# Patient Record
Sex: FEMALE | Race: WHITE | NOT HISPANIC OR LATINO | Employment: OTHER | URBAN - METROPOLITAN AREA
[De-identification: names, ages, dates, MRNs, and addresses within clinical notes are randomized per-mention and may not be internally consistent; named-entity substitution may affect disease eponyms.]

---

## 2023-01-16 PROBLEM — R05.9 COUGH: Status: ACTIVE | Noted: 2023-01-16

## 2023-01-16 PROBLEM — J06.9 ACUTE UPPER RESPIRATORY INFECTION: Status: ACTIVE | Noted: 2023-01-16

## 2023-01-16 PROBLEM — I10 ESSENTIAL HYPERTENSION, BENIGN: Status: ACTIVE | Noted: 2023-01-16

## 2023-01-16 PROBLEM — D50.9 IRON DEFICIENCY ANEMIA, UNSPECIFIED: Status: ACTIVE | Noted: 2023-01-16

## 2023-01-16 PROBLEM — K51.90 ULCERATIVE COLITIS (MULTI): Status: ACTIVE | Noted: 2023-01-16

## 2023-01-16 PROBLEM — E78.5 HYPERLIPIDEMIA: Status: ACTIVE | Noted: 2023-01-16

## 2023-01-16 PROBLEM — E11.65 DIABETES MELLITUS WITH HYPERGLYCEMIA (MULTI): Status: ACTIVE | Noted: 2023-01-16

## 2023-01-16 PROBLEM — D64.9 ANEMIA, UNSPECIFIED: Status: ACTIVE | Noted: 2023-01-16

## 2023-01-16 PROBLEM — R60.9 EDEMA: Status: ACTIVE | Noted: 2023-01-16

## 2023-01-16 PROBLEM — F41.8 SITUATIONAL ANXIETY: Status: ACTIVE | Noted: 2023-01-16

## 2023-01-16 PROBLEM — J20.9 ACUTE BRONCHITIS: Status: ACTIVE | Noted: 2023-01-16

## 2023-01-16 PROBLEM — B37.9 CANDIDIASIS: Status: ACTIVE | Noted: 2023-01-16

## 2023-01-16 RX ORDER — SIMVASTATIN 40 MG/1
40 TABLET, FILM COATED ORAL EVERY EVENING
COMMUNITY
End: 2023-09-05 | Stop reason: ALTCHOICE

## 2023-01-16 RX ORDER — BLOOD SUGAR DIAGNOSTIC
STRIP MISCELLANEOUS
COMMUNITY
End: 2023-01-17 | Stop reason: ENTERED-IN-ERROR

## 2023-01-16 RX ORDER — LOSARTAN POTASSIUM 50 MG/1
50 TABLET ORAL DAILY
COMMUNITY
End: 2023-08-01 | Stop reason: SDUPTHER

## 2023-01-16 RX ORDER — LANCETS
EACH MISCELLANEOUS
COMMUNITY

## 2023-01-16 RX ORDER — HYDROCHLOROTHIAZIDE 12.5 MG/1
12.5 CAPSULE ORAL DAILY PRN
COMMUNITY

## 2023-01-16 RX ORDER — INSULIN LISPRO 100 [IU]/ML
INJECTION, SOLUTION INTRAVENOUS; SUBCUTANEOUS
COMMUNITY

## 2023-01-16 RX ORDER — LANCETS
1 EACH MISCELLANEOUS 3 TIMES DAILY
COMMUNITY

## 2023-01-16 RX ORDER — MESALAMINE 800 MG/1
4800 TABLET, DELAYED RELEASE ORAL DAILY
COMMUNITY

## 2023-01-17 PROBLEM — Z79.4 TYPE 2 DIABETES MELLITUS WITH HYPERGLYCEMIA, WITH LONG-TERM CURRENT USE OF INSULIN (MULTI): Status: ACTIVE | Noted: 2023-01-16

## 2023-01-17 RX ORDER — PEN NEEDLE, DIABETIC 30 GX3/16"
NEEDLE, DISPOSABLE MISCELLANEOUS
COMMUNITY

## 2023-02-21 PROBLEM — E78.5 DYSLIPIDEMIA: Status: ACTIVE | Noted: 2023-02-21

## 2023-02-28 LAB
ALANINE AMINOTRANSFERASE (SGPT) (U/L) IN SER/PLAS: 15 U/L (ref 7–45)
ALBUMIN (G/DL) IN SER/PLAS: 3.9 G/DL (ref 3.4–5)
ALKALINE PHOSPHATASE (U/L) IN SER/PLAS: 78 U/L (ref 33–136)
ANION GAP IN SER/PLAS: 12 MMOL/L (ref 10–20)
ASPARTATE AMINOTRANSFERASE (SGOT) (U/L) IN SER/PLAS: 14 U/L (ref 9–39)
BILIRUBIN TOTAL (MG/DL) IN SER/PLAS: 0.5 MG/DL (ref 0–1.2)
CALCIUM (MG/DL) IN SER/PLAS: 9.7 MG/DL (ref 8.6–10.6)
CARBON DIOXIDE, TOTAL (MMOL/L) IN SER/PLAS: 32 MMOL/L (ref 21–32)
CHLORIDE (MMOL/L) IN SER/PLAS: 102 MMOL/L (ref 98–107)
CHOLESTEROL (MG/DL) IN SER/PLAS: 201 MG/DL (ref 0–199)
CHOLESTEROL IN HDL (MG/DL) IN SER/PLAS: 61 MG/DL
CHOLESTEROL/HDL RATIO: 3.3
CREATININE (MG/DL) IN SER/PLAS: 0.75 MG/DL (ref 0.5–1.05)
ESTIMATED AVERAGE GLUCOSE FOR HBA1C: 192 MG/DL
GFR FEMALE: 82 ML/MIN/1.73M2
GLUCOSE (MG/DL) IN SER/PLAS: 150 MG/DL (ref 74–99)
HEMOGLOBIN A1C/HEMOGLOBIN TOTAL IN BLOOD: 8.3 %
LDL: 119 MG/DL (ref 0–99)
POTASSIUM (MMOL/L) IN SER/PLAS: 4.2 MMOL/L (ref 3.5–5.3)
PROTEIN TOTAL: 7.2 G/DL (ref 6.4–8.2)
SODIUM (MMOL/L) IN SER/PLAS: 142 MMOL/L (ref 136–145)
THYROTROPIN (MIU/L) IN SER/PLAS BY DETECTION LIMIT <= 0.05 MIU/L: 1.76 MIU/L (ref 0.44–3.98)
TRIGLYCERIDE (MG/DL) IN SER/PLAS: 104 MG/DL (ref 0–149)
UREA NITROGEN (MG/DL) IN SER/PLAS: 14 MG/DL (ref 6–23)
VLDL: 21 MG/DL (ref 0–40)

## 2023-03-06 ENCOUNTER — OFFICE VISIT (OUTPATIENT)
Dept: PRIMARY CARE | Facility: CLINIC | Age: 78
End: 2023-03-06
Payer: MEDICARE

## 2023-03-06 VITALS — SYSTOLIC BLOOD PRESSURE: 134 MMHG | HEART RATE: 112 BPM | DIASTOLIC BLOOD PRESSURE: 96 MMHG

## 2023-03-06 DIAGNOSIS — I10 ESSENTIAL HYPERTENSION, BENIGN: ICD-10-CM

## 2023-03-06 DIAGNOSIS — K51.90 ULCERATIVE COLITIS WITHOUT COMPLICATIONS, UNSPECIFIED LOCATION (MULTI): ICD-10-CM

## 2023-03-06 DIAGNOSIS — E11.65 TYPE 2 DIABETES MELLITUS WITH HYPERGLYCEMIA, WITHOUT LONG-TERM CURRENT USE OF INSULIN (MULTI): Primary | ICD-10-CM

## 2023-03-06 DIAGNOSIS — E78.5 DYSLIPIDEMIA: ICD-10-CM

## 2023-03-06 PROCEDURE — 1160F RVW MEDS BY RX/DR IN RCRD: CPT | Performed by: INTERNAL MEDICINE

## 2023-03-06 PROCEDURE — 3080F DIAST BP >= 90 MM HG: CPT | Performed by: INTERNAL MEDICINE

## 2023-03-06 PROCEDURE — 1036F TOBACCO NON-USER: CPT | Performed by: INTERNAL MEDICINE

## 2023-03-06 PROCEDURE — 1159F MED LIST DOCD IN RCRD: CPT | Performed by: INTERNAL MEDICINE

## 2023-03-06 PROCEDURE — 3075F SYST BP GE 130 - 139MM HG: CPT | Performed by: INTERNAL MEDICINE

## 2023-03-06 PROCEDURE — 99214 OFFICE O/P EST MOD 30 MIN: CPT | Performed by: INTERNAL MEDICINE

## 2023-03-06 ASSESSMENT — ENCOUNTER SYMPTOMS: SHORTNESS OF BREATH: 0

## 2023-03-06 NOTE — PROGRESS NOTES
She is here for 3 mo folow up.    She is here for follow up.  She has no new complaints.  Her dental issues are progresing nicely.      Subjective   Lisa Ferguson is a 77 y.o. female who presents for followup.   @[facility]@    Review of Systems   Respiratory:  Negative for shortness of breath.    Cardiovascular:  Negative for chest pain.       Objective   BP (!) 134/96   Pulse (!) 112    Physical Exam  HENT:      Mouth/Throat:      Mouth: Mucous membranes are moist.   Eyes:      Conjunctiva/sclera: Conjunctivae normal.   Cardiovascular:      Rate and Rhythm: Normal rate.   Pulmonary:      Effort: No respiratory distress.      Breath sounds: Normal breath sounds.   Abdominal:      General: Bowel sounds are normal. There is no distension.      Palpations: Abdomen is soft. There is no mass.   Neurological:      General: No focal deficit present.      Mental Status: She is alert.         Assessment/Plan   Problem List Items Addressed This Visit    None  1.type 2 dm with hyperglycemia w/o long term insulin: she also follows with deepali Roy on basaglar,lispro.,januviacontinue smbg.has not had any hypo.  2. Benign htn: remian on losartan 50 hydrochlorothiazide.  3. Hyperlipidamia: remain on simva.  4. Ulcerative colitis: on mesalamine.stable  Check labs before next visit.

## 2023-03-21 ENCOUNTER — TELEPHONE (OUTPATIENT)
Dept: PRIMARY CARE | Facility: CLINIC | Age: 78
End: 2023-03-21
Payer: MEDICARE

## 2023-03-21 NOTE — TELEPHONE ENCOUNTER
Patient called through  the service at 5 AM this morning.  The message I received said she was calling because she was worried about a blood sugar of 177.  I tried to call the patient several times but could not get through because her phone was busy.  I then got a direct call from the answering service saying the patient had called them multiple times about this blood sugar, which is probably why I could not get a hold of her and why her phone was busy.    I did speak with the patient, she sounded very anxious.  She said that she had a blood sugar of 177 today when she woke up. She woke up feeling anxious and a little sweaty  so checked her sugar. I asked her what was her concern about the blood sugar, and she did not really have an answer.  She said she takes Humalog 8 units with each meal and she takes her long-acting insulin in the evening 16 or 18 units (cannot recall exactly which number she told me).  Her sugar the day before early morning was 93 and she ate some food to take care of that.  Prior to that it was in the 120s.  She is not having any hypoglycemia.    She said she thought she was having a panic attack.  She said she had chest pressure between her breasts that went through to her back and when I told her I wanted her to go to the emergency room for this issue, she said that it was gone. She said it only lasted a couple of minutes . She also attributes it to coughing a lot and she states she has been coughing a lot and coughs up clear phlegm because she has bronchitis.  When I asked her if she had had the bronchitis treated, she said she has had that for 25 years.Denied fever . She admitted she was a little short of breath when woke up but not now. She was speaking rapidly in full sentences and did sound anxious.    She confirmed she takes medication for hypertension and 1 for her cholesterol, losartan and simvastatin.  Again I encouraged her to go to the emergency room for chest pressure.  She said  that the chest issue was not a problem, did not want to go to the er,  just wanted to know if it was okay to take Tylenol,  Advised her to take 2 Tylenol, she seemed reassured by this.  She insisted her chest pressure was gone and it had only lasted a few minutes.  I suggested she take 1 baby aspirin but she said she cannot take aspirin.  I did encourage her to go to the emergency room if she had those symptoms come back and that I would let Dr. Contreras know.

## 2023-03-22 ENCOUNTER — TELEPHONE (OUTPATIENT)
Dept: PRIMARY CARE | Facility: CLINIC | Age: 78
End: 2023-03-22
Payer: MEDICARE

## 2023-03-22 NOTE — TELEPHONE ENCOUNTER
Patient is in [facility] and states she had a heart attach.   She also stated she may need a stent.   Patient will be turning her phone off so we can't call her back.

## 2023-03-27 ENCOUNTER — PATIENT OUTREACH (OUTPATIENT)
Dept: PRIMARY CARE | Facility: CLINIC | Age: 78
End: 2023-03-27
Payer: MEDICARE

## 2023-03-27 NOTE — PROGRESS NOTES
"Discharge Facility: [facility]  Discharge Diagnosis: NSTEMI  Admission Date: 3/21/2023  Discharge Date: 3/25/2023    PCP appt: TBD-patient declined to make appt at time of outreach. Office tasked to outreach patient.  Patient states \"Dr. Contreras has all this information already\" and refused to engage any further in conversation with me at time of outreach call. States she is doing \"fair\" and denies CP/SOB at time of call. States if she had issues still \"I wouldn't be home now\". Denied questions or concerns for her PCP at time of call.    Engagement  Call Start Time: 1405 (3/27/2023  2:06 PM)    Medications  Medications reviewed with patient/caregiver?: Not applicable (Patient not interested in speaking to me at this time) (3/27/2023  2:06 PM)  Does the patient have all medications ordered at discharge?: Not applicable (3/27/2023  2:06 PM)  Is the patient taking all medications as directed (includes completed medication regime)?: Not applicable (3/27/2023  2:06 PM)    Appointments  Does the patient have a primary care provider?: Yes (3/27/2023  2:06 PM)  Care Management Interventions: -- (Patient declined to make a follow up appt with PCP at time of call) (3/27/2023  2:06 PM)  Has the patient kept scheduled appointments due by today?: Yes (3/27/2023  2:06 PM)    Self Management  What is the home health agency?: denies need (3/27/2023  2:06 PM)    Patient Teaching  What is the patient's perception of their health status since discharge?: Improving (3/27/2023  2:06 PM)  Is the patient/caregiver able to teach back the hierarchy of who to call/visit for symptoms/problems? PCP, Specialist, Home Health nurse, Urgent Care, ED, 911: Yes (3/27/2023  2:06 PM)          "

## 2023-04-07 ENCOUNTER — PATIENT OUTREACH (OUTPATIENT)
Dept: PRIMARY CARE | Facility: CLINIC | Age: 78
End: 2023-04-07
Payer: MEDICARE

## 2023-04-07 RX ORDER — CARVEDILOL 3.12 MG/1
3.12 TABLET ORAL 2 TIMES DAILY
COMMUNITY
Start: 2023-04-06

## 2023-04-07 RX ORDER — CLOPIDOGREL BISULFATE 75 MG/1
75 TABLET ORAL
COMMUNITY
Start: 2023-04-04

## 2023-04-07 NOTE — PROGRESS NOTES
"Call regarding apt after hospitalization for NSTEMI.  At time of outreach call the patient feels as if their condition has returned to baseline since last visit.  Patient verbalizes understanding of new orders including labs, therapy, HHC, and DME.   Patient verbalizes understanding of medications including changes made during PCP. States her cardiologist has made medications changes and she is now on a \"Blood thinner\". Did not wish to go over medications during call but verbalizes she understands them.  Patient verbalizes understanding of referrals needed to specialist.  Patient verbalized understanding plan of care   Any further questions or concerns at this time for PCP? No  Does patient appear to have CCM needs and will need referral to nurse after call back? No    "

## 2023-04-13 ENCOUNTER — TELEPHONE (OUTPATIENT)
Dept: PRIMARY CARE | Facility: CLINIC | Age: 78
End: 2023-04-13

## 2023-04-13 NOTE — TELEPHONE ENCOUNTER
Pt. Discharged on 3/28/23. Where may I work this pt. In for follow up. How much time. Please see message from Juan A Toledo RN.

## 2023-04-13 NOTE — TELEPHONE ENCOUNTER
----- Message from Karin Fam LPN sent at 4/5/2023  8:38 AM EDT -----  Regarding: FW: Schedule follow up appt with PCP after hospitalization    ----- Message -----  From: Carroll Toledo RN  Sent: 3/27/2023   2:13 PM EDT  To: #  Subject: Schedule follow up appt with PCP after hospi#    This patient was discharged from: [facility]  Discharge diagnosis:   NSTEMI  Date of discharge:      3/25/2023  No PCP appointments available within 14 days of discharge  Message sent to [facility] clinical pool to reach out to patient and schedule an appointment within 7-13 days from discharge date.

## 2023-04-17 ENCOUNTER — TELEPHONE (OUTPATIENT)
Dept: PRIMARY CARE | Facility: CLINIC | Age: 78
End: 2023-04-17
Payer: MEDICARE

## 2023-04-17 NOTE — TELEPHONE ENCOUNTER
"Spoke with patient to schedule hospital follow up and she refused and stated \" I don't need to come and I won't be there\", then hung up. Just wanted to inform you, thank you!   "

## 2023-08-01 DIAGNOSIS — I10 ESSENTIAL HYPERTENSION, BENIGN: ICD-10-CM

## 2023-08-01 RX ORDER — LOSARTAN POTASSIUM 50 MG/1
100 TABLET ORAL DAILY
Qty: 60 TABLET | Refills: 0 | Status: SHIPPED | OUTPATIENT
Start: 2023-08-01 | End: 2023-10-18 | Stop reason: SDUPTHER

## 2023-08-01 NOTE — TELEPHONE ENCOUNTER
Patient calling saying her blood pressure has been running high per specialist. She started taking losartan 100 mg. Instead of 50 mg. Needs a new script called in. Sent in an e-mail message as well, but not enough details. Can script be sent to [email or web address].

## 2023-08-14 ENCOUNTER — TELEPHONE (OUTPATIENT)
Dept: PRIMARY CARE | Facility: CLINIC | Age: 78
End: 2023-08-14
Payer: MEDICARE

## 2023-08-14 NOTE — TELEPHONE ENCOUNTER
PT called to see if she needed to stop taking her blood thinners before A1C.  Dali check with Dr ALBERT and DN, both said no. Relayed to Pt

## 2023-08-28 ENCOUNTER — LAB (OUTPATIENT)
Dept: LAB | Facility: LAB | Age: 78
End: 2023-08-28
Payer: MEDICARE

## 2023-08-28 DIAGNOSIS — E11.65 TYPE 2 DIABETES MELLITUS WITH HYPERGLYCEMIA, WITHOUT LONG-TERM CURRENT USE OF INSULIN (MULTI): ICD-10-CM

## 2023-08-28 LAB
ALANINE AMINOTRANSFERASE (SGPT) (U/L) IN SER/PLAS: 15 U/L (ref 7–45)
ALBUMIN (G/DL) IN SER/PLAS: 3.8 G/DL (ref 3.4–5)
ALBUMIN (MG/L) IN URINE: 88.5 MG/L
ALBUMIN/CREATININE (UG/MG) IN URINE: 147 UG/MG CRT (ref 0–30)
ALKALINE PHOSPHATASE (U/L) IN SER/PLAS: 82 U/L (ref 33–136)
ANION GAP IN SER/PLAS: 14 MMOL/L (ref 10–20)
ASPARTATE AMINOTRANSFERASE (SGOT) (U/L) IN SER/PLAS: 15 U/L (ref 9–39)
BASOPHILS (10*3/UL) IN BLOOD BY AUTOMATED COUNT: 0.08 X10E9/L (ref 0–0.1)
BASOPHILS/100 LEUKOCYTES IN BLOOD BY AUTOMATED COUNT: 0.8 % (ref 0–2)
BILIRUBIN TOTAL (MG/DL) IN SER/PLAS: 0.4 MG/DL (ref 0–1.2)
CALCIUM (MG/DL) IN SER/PLAS: 9.2 MG/DL (ref 8.6–10.6)
CARBON DIOXIDE, TOTAL (MMOL/L) IN SER/PLAS: 25 MMOL/L (ref 21–32)
CHLORIDE (MMOL/L) IN SER/PLAS: 105 MMOL/L (ref 98–107)
CHOLESTEROL (MG/DL) IN SER/PLAS: 131 MG/DL (ref 0–199)
CHOLESTEROL IN HDL (MG/DL) IN SER/PLAS: 53 MG/DL
CHOLESTEROL/HDL RATIO: 2.5
CREATININE (MG/DL) IN SER/PLAS: 0.63 MG/DL (ref 0.5–1.05)
CREATININE (MG/DL) IN URINE: 60.2 MG/DL (ref 20–320)
EOSINOPHILS (10*3/UL) IN BLOOD BY AUTOMATED COUNT: 0.22 X10E9/L (ref 0–0.4)
EOSINOPHILS/100 LEUKOCYTES IN BLOOD BY AUTOMATED COUNT: 2.1 % (ref 0–6)
ERYTHROCYTE DISTRIBUTION WIDTH (RATIO) BY AUTOMATED COUNT: 16.9 % (ref 11.5–14.5)
ERYTHROCYTE MEAN CORPUSCULAR HEMOGLOBIN CONCENTRATION (G/DL) BY AUTOMATED: 31.8 G/DL (ref 32–36)
ERYTHROCYTE MEAN CORPUSCULAR VOLUME (FL) BY AUTOMATED COUNT: 89 FL (ref 80–100)
ERYTHROCYTES (10*6/UL) IN BLOOD BY AUTOMATED COUNT: 4.61 X10E12/L (ref 4–5.2)
ESTIMATED AVERAGE GLUCOSE FOR HBA1C: 183 MG/DL
GFR FEMALE: >90 ML/MIN/1.73M2
GLUCOSE (MG/DL) IN SER/PLAS: 130 MG/DL (ref 74–99)
HEMATOCRIT (%) IN BLOOD BY AUTOMATED COUNT: 40.9 % (ref 36–46)
HEMOGLOBIN (G/DL) IN BLOOD: 13 G/DL (ref 12–16)
HEMOGLOBIN A1C/HEMOGLOBIN TOTAL IN BLOOD: 8 %
IMMATURE GRANULOCYTES/100 LEUKOCYTES IN BLOOD BY AUTOMATED COUNT: 0.3 % (ref 0–0.9)
LDL: 64 MG/DL (ref 0–99)
LEUKOCYTES (10*3/UL) IN BLOOD BY AUTOMATED COUNT: 10.3 X10E9/L (ref 4.4–11.3)
LYMPHOCYTES (10*3/UL) IN BLOOD BY AUTOMATED COUNT: 2.59 X10E9/L (ref 0.8–3)
LYMPHOCYTES/100 LEUKOCYTES IN BLOOD BY AUTOMATED COUNT: 25.1 % (ref 13–44)
MONOCYTES (10*3/UL) IN BLOOD BY AUTOMATED COUNT: 1.13 X10E9/L (ref 0.05–0.8)
MONOCYTES/100 LEUKOCYTES IN BLOOD BY AUTOMATED COUNT: 10.9 % (ref 2–10)
NEUTROPHILS (10*3/UL) IN BLOOD BY AUTOMATED COUNT: 6.28 X10E9/L (ref 1.6–5.5)
NEUTROPHILS/100 LEUKOCYTES IN BLOOD BY AUTOMATED COUNT: 60.8 % (ref 40–80)
NRBC (PER 100 WBCS) BY AUTOMATED COUNT: 0 /100 WBC (ref 0–0)
PLATELETS (10*3/UL) IN BLOOD AUTOMATED COUNT: 232 X10E9/L (ref 150–450)
POTASSIUM (MMOL/L) IN SER/PLAS: 4.5 MMOL/L (ref 3.5–5.3)
PROTEIN TOTAL: 6.9 G/DL (ref 6.4–8.2)
SODIUM (MMOL/L) IN SER/PLAS: 139 MMOL/L (ref 136–145)
THYROTROPIN (MIU/L) IN SER/PLAS BY DETECTION LIMIT <= 0.05 MIU/L: 1.54 MIU/L (ref 0.44–3.98)
TRIGLYCERIDE (MG/DL) IN SER/PLAS: 71 MG/DL (ref 0–149)
UREA NITROGEN (MG/DL) IN SER/PLAS: 13 MG/DL (ref 6–23)
VLDL: 14 MG/DL (ref 0–40)

## 2023-08-28 PROCEDURE — 82570 ASSAY OF URINE CREATININE: CPT

## 2023-08-28 PROCEDURE — 80053 COMPREHEN METABOLIC PANEL: CPT

## 2023-08-28 PROCEDURE — 85025 COMPLETE CBC W/AUTO DIFF WBC: CPT

## 2023-08-28 PROCEDURE — 36415 COLL VENOUS BLD VENIPUNCTURE: CPT

## 2023-08-28 PROCEDURE — 80061 LIPID PANEL: CPT

## 2023-08-28 PROCEDURE — 84443 ASSAY THYROID STIM HORMONE: CPT

## 2023-08-28 PROCEDURE — 83036 HEMOGLOBIN GLYCOSYLATED A1C: CPT

## 2023-08-28 PROCEDURE — 82043 UR ALBUMIN QUANTITATIVE: CPT

## 2023-08-30 ENCOUNTER — APPOINTMENT (OUTPATIENT)
Dept: PRIMARY CARE | Facility: CLINIC | Age: 78
End: 2023-08-30
Payer: MEDICARE

## 2023-09-05 ENCOUNTER — OFFICE VISIT (OUTPATIENT)
Dept: PRIMARY CARE | Facility: CLINIC | Age: 78
End: 2023-09-05
Payer: MEDICARE

## 2023-09-05 VITALS
RESPIRATION RATE: 16 BRPM | DIASTOLIC BLOOD PRESSURE: 94 MMHG | BODY MASS INDEX: 32.77 KG/M2 | HEIGHT: 63 IN | SYSTOLIC BLOOD PRESSURE: 131 MMHG

## 2023-09-05 DIAGNOSIS — I25.10 CORONARY ARTERY DISEASE INVOLVING NATIVE CORONARY ARTERY OF NATIVE HEART WITHOUT ANGINA PECTORIS: ICD-10-CM

## 2023-09-05 DIAGNOSIS — Z79.4 TYPE 2 DIABETES MELLITUS WITH HYPERGLYCEMIA, WITH LONG-TERM CURRENT USE OF INSULIN (MULTI): Primary | ICD-10-CM

## 2023-09-05 DIAGNOSIS — I10 BENIGN ESSENTIAL HTN: ICD-10-CM

## 2023-09-05 DIAGNOSIS — I72.8: ICD-10-CM

## 2023-09-05 DIAGNOSIS — E11.65 TYPE 2 DIABETES MELLITUS WITH HYPERGLYCEMIA, WITH LONG-TERM CURRENT USE OF INSULIN (MULTI): Primary | ICD-10-CM

## 2023-09-05 DIAGNOSIS — K51.80 OTHER ULCERATIVE COLITIS WITHOUT COMPLICATION (MULTI): ICD-10-CM

## 2023-09-05 DIAGNOSIS — Z79.899 MEDICATION MANAGEMENT: ICD-10-CM

## 2023-09-05 DIAGNOSIS — I21.4 NON-STEMI (NON-ST ELEVATED MYOCARDIAL INFARCTION) (MULTI): ICD-10-CM

## 2023-09-05 PROCEDURE — 1159F MED LIST DOCD IN RCRD: CPT | Performed by: INTERNAL MEDICINE

## 2023-09-05 PROCEDURE — 1160F RVW MEDS BY RX/DR IN RCRD: CPT | Performed by: INTERNAL MEDICINE

## 2023-09-05 PROCEDURE — 99214 OFFICE O/P EST MOD 30 MIN: CPT | Performed by: INTERNAL MEDICINE

## 2023-09-05 PROCEDURE — 1126F AMNT PAIN NOTED NONE PRSNT: CPT | Performed by: INTERNAL MEDICINE

## 2023-09-05 PROCEDURE — 3080F DIAST BP >= 90 MM HG: CPT | Performed by: INTERNAL MEDICINE

## 2023-09-05 PROCEDURE — 1036F TOBACCO NON-USER: CPT | Performed by: INTERNAL MEDICINE

## 2023-09-05 PROCEDURE — 3075F SYST BP GE 130 - 139MM HG: CPT | Performed by: INTERNAL MEDICINE

## 2023-09-05 RX ORDER — VALSARTAN 40 MG/1
40 TABLET ORAL DAILY
COMMUNITY
End: 2024-04-01 | Stop reason: ALTCHOICE

## 2023-09-05 RX ORDER — ROSUVASTATIN CALCIUM 40 MG/1
40 TABLET, COATED ORAL DAILY
COMMUNITY
Start: 2023-03-29

## 2023-09-05 ASSESSMENT — PATIENT HEALTH QUESTIONNAIRE - PHQ9
SUM OF ALL RESPONSES TO PHQ9 QUESTIONS 1 AND 2: 2
2. FEELING DOWN, DEPRESSED OR HOPELESS: SEVERAL DAYS
1. LITTLE INTEREST OR PLEASURE IN DOING THINGS: SEVERAL DAYS

## 2023-09-05 NOTE — PROGRESS NOTES
"Subjective   Lisa Ferguson is a 78 y.o. female who presents for Follow-up.    Jazmyne had a non STEMI and had stenting in March 2023 had a stent placed at [facility]  She had been on Eliquis because her initial echo showed apical akinesis and they were not able to rule out a thrombus.  Her last LDL was 64 which is excellent.  She had laser eye surgery by Dr Jiménez due to high pressures in eye    She says that the mesalamine is making her cough; she has been on it for 17 years  I told her to remain on the medication and she said that she will.  I told her that cough is not a common side effect of mesalamine.    She says that her valsartan costs over $300    She says she had a CT angiogram ordered by Dr Barbour    Says her sugar was 143 this am   Her recent A1c was 8.0.  Her fasting sugar when she had her labs was 130.    She is going to have 3 teeth pulled in her lower jaw at the end of September    She will see Dr Padilla for her diabetes and Dr Noonan for her colitis  Review of Systems    Objective   BP (!) 131/94 (BP Location: Left arm, Patient Position: Sitting, BP Cuff Size: Adult)   Resp 16   Ht 1.6 m (5' 3\")   BMI 32.77 kg/m²    Physical Exam  Visit Vitals  BP (!) 131/94 (BP Location: Left arm, Patient Position: Sitting, BP Cuff Size: Adult)   Resp 16   Ht 1.6 m (5' 3\")   BMI 32.77 kg/m²   Smoking Status Never   BSA 1.93 m²      GEN: NAD  HEENT: normal  NECK: no adenopathy, no thyroid enlargment  LUNGS: CTAB  CV: reg S1/S2 no murmurs  EXT: no leg edema   Assessment/Plan   Problem List Items Addressed This Visit       Type 2 diabetes mellitus with hyperglycemia, with long-term current use of insulin (CMS/[facility]) - Primary    Relevant Medications    SITagliptin phosphate (Januvia) 100 mg tablet  She continues on glargine (Basaglar) insulin and also as needed Humalog  She has not been having any low sugar reactions.  She primarily follows with endocrine    Other Relevant Orders    Comprehensive Metabolic Panel    Lipid " Panel    TSH with reflex to Free T4 if abnormal    Hemoglobin A1C    Ulcerative colitis (CMS/HCC)    Non-STEMI (non-ST elevated myocardial infarction) (CMS/[facility]) she is on Eliquis now due to findings of low ejection fraction, dilated left ventricle and inability to rule out thrombus on imaging.    Coronary artery disease involving native coronary artery of native heart without angina pectoris    Relevant Orders    Lipid Panel    Subclavian aneurysm (CMS/[facility])     I have reviewed her chart from care everywhere from the [facility] and I do not see any evidence of there being a subclavian artery aneurysm from any of the studies that were done.  It was a presumptive diagnosis entered somehow because of unequal blood pressures in both arms.          Other Visit Diagnoses       Medication management        Relevant Orders    CBC and Auto Differential    Benign essential HTN

## 2023-09-05 NOTE — PATIENT INSTRUCTIONS
See me again in 6 months for Medicare Wellness visit.     Get blood test after fasting close to next visit.   Get blood test done after fasting overnight.  The  lab is on the first floor.  Lab hours are 7 am to 4 pm Mon-Fri and 8am to Noon on Saturday.  No appt is needed.  Orders are entered into the system so you do not need a paper order.

## 2023-09-07 ENCOUNTER — APPOINTMENT (OUTPATIENT)
Dept: PRIMARY CARE | Facility: CLINIC | Age: 78
End: 2023-09-07
Payer: MEDICARE

## 2023-09-17 PROBLEM — I50.20 SYSTOLIC HEART FAILURE SECONDARY TO CORONARY ARTERY DISEASE (MULTI): Status: ACTIVE | Noted: 2023-09-17

## 2023-09-17 PROBLEM — I21.4 NON-STEMI (NON-ST ELEVATED MYOCARDIAL INFARCTION) (MULTI): Status: ACTIVE | Noted: 2023-09-17

## 2023-09-17 PROBLEM — I25.10 CORONARY ARTERY DISEASE INVOLVING NATIVE CORONARY ARTERY OF NATIVE HEART WITHOUT ANGINA PECTORIS: Status: ACTIVE | Noted: 2023-09-17

## 2023-09-17 PROBLEM — I25.10 SYSTOLIC HEART FAILURE SECONDARY TO CORONARY ARTERY DISEASE (MULTI): Status: ACTIVE | Noted: 2023-09-17

## 2023-09-17 PROBLEM — I72.8: Status: ACTIVE | Noted: 2023-09-17

## 2023-09-17 PROBLEM — I44.7 LEFT BUNDLE BRANCH BLOCK: Status: ACTIVE | Noted: 2023-09-17

## 2023-09-17 NOTE — ASSESSMENT & PLAN NOTE
I have reviewed her chart from care everywhere from the [facility] and I do not see any evidence of there being a subclavian artery aneurysm from any of the studies that were done.  It was a presumptive diagnosis entered somehow because of unequal blood pressures in both arms.

## 2023-10-03 ENCOUNTER — OFFICE (OUTPATIENT)
Dept: URBAN - METROPOLITAN AREA CLINIC 26 | Facility: CLINIC | Age: 78
End: 2023-10-03
Payer: COMMERCIAL

## 2023-10-03 VITALS
DIASTOLIC BLOOD PRESSURE: 90 MMHG | TEMPERATURE: 97.6 F | HEART RATE: 106 BPM | HEIGHT: 63 IN | SYSTOLIC BLOOD PRESSURE: 151 MMHG

## 2023-10-03 DIAGNOSIS — Z09 ENCOUNTER FOR FOLLOW-UP EXAMINATION AFTER COMPLETED TREATMEN: ICD-10-CM

## 2023-10-03 DIAGNOSIS — Z86.010 PERSONAL HISTORY OF COLONIC POLYPS: ICD-10-CM

## 2023-10-03 PROCEDURE — 99214 OFFICE O/P EST MOD 30 MIN: CPT | Performed by: INTERNAL MEDICINE

## 2023-10-18 DIAGNOSIS — I10 ESSENTIAL HYPERTENSION, BENIGN: ICD-10-CM

## 2023-10-19 RX ORDER — LOSARTAN POTASSIUM 50 MG/1
100 TABLET ORAL DAILY
Qty: 180 TABLET | Refills: 1 | Status: SHIPPED | OUTPATIENT
Start: 2023-10-19

## 2023-12-19 ENCOUNTER — TELEPHONE (OUTPATIENT)
Dept: PRIMARY CARE | Facility: CLINIC | Age: 78
End: 2023-12-19
Payer: MEDICARE

## 2023-12-19 NOTE — TELEPHONE ENCOUNTER
[facility] calling stating pt is taking Entresto from another provider and Losartan. She has been on these for some time now but wants to make sure you are aware and okay with this.

## 2024-01-08 ENCOUNTER — TELEPHONE (OUTPATIENT)
Dept: PRIMARY CARE | Facility: CLINIC | Age: 79
End: 2024-01-08
Payer: MEDICARE

## 2024-01-08 DIAGNOSIS — R26.9 GAIT DISORDER: Primary | ICD-10-CM

## 2024-01-08 NOTE — TELEPHONE ENCOUNTER
Patient calling asking for a handicap placard for parking . Has had a history of back issues, and with being sick  gets out of breath walking. Can she get a placard.  [phone]

## 2024-03-06 ENCOUNTER — APPOINTMENT (OUTPATIENT)
Dept: PRIMARY CARE | Facility: CLINIC | Age: 79
End: 2024-03-06
Payer: MEDICARE

## 2024-03-29 ENCOUNTER — LAB (OUTPATIENT)
Dept: LAB | Facility: LAB | Age: 79
End: 2024-03-29
Payer: MEDICARE

## 2024-03-29 DIAGNOSIS — I25.10 CORONARY ARTERY DISEASE INVOLVING NATIVE CORONARY ARTERY OF NATIVE HEART WITHOUT ANGINA PECTORIS: ICD-10-CM

## 2024-03-29 DIAGNOSIS — Z79.4 TYPE 2 DIABETES MELLITUS WITH HYPERGLYCEMIA, WITH LONG-TERM CURRENT USE OF INSULIN (MULTI): ICD-10-CM

## 2024-03-29 DIAGNOSIS — E11.65 TYPE 2 DIABETES MELLITUS WITH HYPERGLYCEMIA, WITH LONG-TERM CURRENT USE OF INSULIN (MULTI): ICD-10-CM

## 2024-03-29 DIAGNOSIS — Z79.899 MEDICATION MANAGEMENT: ICD-10-CM

## 2024-03-29 LAB
ALBUMIN SERPL BCP-MCNC: 3.8 G/DL (ref 3.4–5)
ALP SERPL-CCNC: 68 U/L (ref 33–136)
ALT SERPL W P-5'-P-CCNC: 13 U/L (ref 7–45)
ANION GAP SERPL CALC-SCNC: 12 MMOL/L (ref 10–20)
AST SERPL W P-5'-P-CCNC: 16 U/L (ref 9–39)
BASOPHILS # BLD AUTO: 0.07 X10*3/UL (ref 0–0.1)
BASOPHILS NFR BLD AUTO: 0.7 %
BILIRUB SERPL-MCNC: 0.4 MG/DL (ref 0–1.2)
BUN SERPL-MCNC: 15 MG/DL (ref 6–23)
CALCIUM SERPL-MCNC: 9.4 MG/DL (ref 8.6–10.6)
CHLORIDE SERPL-SCNC: 105 MMOL/L (ref 98–107)
CHOLEST SERPL-MCNC: 123 MG/DL (ref 0–199)
CHOLESTEROL/HDL RATIO: 2.5
CO2 SERPL-SCNC: 29 MMOL/L (ref 21–32)
CREAT SERPL-MCNC: 0.77 MG/DL (ref 0.5–1.05)
EGFRCR SERPLBLD CKD-EPI 2021: 80 ML/MIN/1.73M*2
EOSINOPHIL # BLD AUTO: 0.13 X10*3/UL (ref 0–0.4)
EOSINOPHIL NFR BLD AUTO: 1.3 %
ERYTHROCYTE [DISTWIDTH] IN BLOOD BY AUTOMATED COUNT: 15.3 % (ref 11.5–14.5)
EST. AVERAGE GLUCOSE BLD GHB EST-MCNC: 200 MG/DL
GLUCOSE SERPL-MCNC: 156 MG/DL (ref 74–99)
HBA1C MFR BLD: 8.6 %
HCT VFR BLD AUTO: 41.9 % (ref 36–46)
HDLC SERPL-MCNC: 48.4 MG/DL
HGB BLD-MCNC: 12.7 G/DL (ref 12–16)
IMM GRANULOCYTES # BLD AUTO: 0.07 X10*3/UL (ref 0–0.5)
IMM GRANULOCYTES NFR BLD AUTO: 0.7 % (ref 0–0.9)
LDLC SERPL CALC-MCNC: 59 MG/DL
LYMPHOCYTES # BLD AUTO: 2 X10*3/UL (ref 0.8–3)
LYMPHOCYTES NFR BLD AUTO: 20.5 %
MCH RBC QN AUTO: 28.2 PG (ref 26–34)
MCHC RBC AUTO-ENTMCNC: 30.3 G/DL (ref 32–36)
MCV RBC AUTO: 93 FL (ref 80–100)
MONOCYTES # BLD AUTO: 1.09 X10*3/UL (ref 0.05–0.8)
MONOCYTES NFR BLD AUTO: 11.2 %
NEUTROPHILS # BLD AUTO: 6.38 X10*3/UL (ref 1.6–5.5)
NEUTROPHILS NFR BLD AUTO: 65.6 %
NON HDL CHOLESTEROL: 75 MG/DL (ref 0–149)
NRBC BLD-RTO: 0 /100 WBCS (ref 0–0)
PLATELET # BLD AUTO: 220 X10*3/UL (ref 150–450)
POTASSIUM SERPL-SCNC: 4.3 MMOL/L (ref 3.5–5.3)
PROT SERPL-MCNC: 6.9 G/DL (ref 6.4–8.2)
RBC # BLD AUTO: 4.51 X10*6/UL (ref 4–5.2)
SODIUM SERPL-SCNC: 142 MMOL/L (ref 136–145)
TRIGL SERPL-MCNC: 76 MG/DL (ref 0–149)
TSH SERPL-ACNC: 1.9 MIU/L (ref 0.44–3.98)
VLDL: 15 MG/DL (ref 0–40)
WBC # BLD AUTO: 9.7 X10*3/UL (ref 4.4–11.3)

## 2024-03-29 PROCEDURE — 80053 COMPREHEN METABOLIC PANEL: CPT

## 2024-03-29 PROCEDURE — 83036 HEMOGLOBIN GLYCOSYLATED A1C: CPT

## 2024-03-29 PROCEDURE — 84443 ASSAY THYROID STIM HORMONE: CPT

## 2024-03-29 PROCEDURE — 36415 COLL VENOUS BLD VENIPUNCTURE: CPT

## 2024-03-29 PROCEDURE — 80061 LIPID PANEL: CPT

## 2024-03-29 PROCEDURE — 85025 COMPLETE CBC W/AUTO DIFF WBC: CPT

## 2024-04-01 ENCOUNTER — OFFICE VISIT (OUTPATIENT)
Dept: PRIMARY CARE | Facility: CLINIC | Age: 79
End: 2024-04-01
Payer: MEDICARE

## 2024-04-01 VITALS
BODY MASS INDEX: 32.77 KG/M2 | HEIGHT: 63 IN | DIASTOLIC BLOOD PRESSURE: 88 MMHG | SYSTOLIC BLOOD PRESSURE: 125 MMHG | HEART RATE: 78 BPM

## 2024-04-01 DIAGNOSIS — E11.29 TYPE 2 DIABETES MELLITUS WITH MICROALBUMINURIA, WITH LONG-TERM CURRENT USE OF INSULIN (MULTI): ICD-10-CM

## 2024-04-01 DIAGNOSIS — I25.10 SYSTOLIC HEART FAILURE SECONDARY TO CORONARY ARTERY DISEASE (MULTI): ICD-10-CM

## 2024-04-01 DIAGNOSIS — R80.9 TYPE 2 DIABETES MELLITUS WITH MICROALBUMINURIA, WITH LONG-TERM CURRENT USE OF INSULIN (MULTI): ICD-10-CM

## 2024-04-01 DIAGNOSIS — I25.119 CORONARY ARTERY DISEASE INVOLVING NATIVE CORONARY ARTERY OF NATIVE HEART WITH ANGINA PECTORIS (CMS-HCC): ICD-10-CM

## 2024-04-01 DIAGNOSIS — Z79.4 TYPE 2 DIABETES MELLITUS WITH MICROALBUMINURIA, WITH LONG-TERM CURRENT USE OF INSULIN (MULTI): ICD-10-CM

## 2024-04-01 DIAGNOSIS — E11.65 TYPE 2 DIABETES MELLITUS WITH HYPERGLYCEMIA, WITH LONG-TERM CURRENT USE OF INSULIN (MULTI): Primary | ICD-10-CM

## 2024-04-01 DIAGNOSIS — K51.80 OTHER ULCERATIVE COLITIS WITHOUT COMPLICATION (MULTI): ICD-10-CM

## 2024-04-01 DIAGNOSIS — Z79.4 TYPE 2 DIABETES MELLITUS WITH HYPERGLYCEMIA, WITH LONG-TERM CURRENT USE OF INSULIN (MULTI): Primary | ICD-10-CM

## 2024-04-01 DIAGNOSIS — I50.20 SYSTOLIC HEART FAILURE SECONDARY TO CORONARY ARTERY DISEASE (MULTI): ICD-10-CM

## 2024-04-01 DIAGNOSIS — I72.8: ICD-10-CM

## 2024-04-01 PROCEDURE — 1126F AMNT PAIN NOTED NONE PRSNT: CPT | Performed by: INTERNAL MEDICINE

## 2024-04-01 PROCEDURE — 3074F SYST BP LT 130 MM HG: CPT | Performed by: INTERNAL MEDICINE

## 2024-04-01 PROCEDURE — 1157F ADVNC CARE PLAN IN RCRD: CPT | Performed by: INTERNAL MEDICINE

## 2024-04-01 PROCEDURE — 99214 OFFICE O/P EST MOD 30 MIN: CPT | Performed by: INTERNAL MEDICINE

## 2024-04-01 PROCEDURE — 3079F DIAST BP 80-89 MM HG: CPT | Performed by: INTERNAL MEDICINE

## 2024-04-01 RX ORDER — SACUBITRIL AND VALSARTAN 24; 26 MG/1; MG/1
1 TABLET, FILM COATED ORAL 2 TIMES DAILY
COMMUNITY

## 2024-04-01 RX ORDER — LATANOPROST 50 UG/ML
1 SOLUTION/ DROPS OPHTHALMIC NIGHTLY
COMMUNITY

## 2024-04-01 ASSESSMENT — PATIENT HEALTH QUESTIONNAIRE - PHQ9
2. FEELING DOWN, DEPRESSED OR HOPELESS: NOT AT ALL
SUM OF ALL RESPONSES TO PHQ9 QUESTIONS 1 AND 2: 0
1. LITTLE INTEREST OR PLEASURE IN DOING THINGS: NOT AT ALL

## 2024-04-01 ASSESSMENT — PAIN SCALES - GENERAL: PAINLEVEL: 0-NO PAIN

## 2024-04-01 NOTE — PROGRESS NOTES
"Subjective   Lisa Ferguson is a 78 y.o. female who presents for Follow-up (Patient here for follow up visit, has a couple things she would like to discuss with Dr Contreras.).    She has an appt with April 17 with Fabi  She says that at her last visit she became upset with him because of some things he said to her   It seems there is a personality conflict between the two of them and she no longer wishes to see him   She was seeing him for her diabetes    She is taking 16 basaglar at dinner and 8 units humalog with each meal  She usually eats three times a day  She has been checking her sugars at home  She says she mainly checks in the morning every day, and sometimes before meals  She says once or twice a month she will have a low sugar with symptoms and she will eat something  She does carry something with her to eat    She says she has an accucheck meter, it is 13 years old    Breakfast will be a bagel, or grits  Lunch: can have soup and tuna fish   Dinner: fish  She also has one bottle of pomegranate juice daily     She had been on pioglitazone and metformin    She is following more closely for her glaucoma because there has been a change in her pressures  She is compliant with using her drops   She had a laser procedure in her right eye  She is seeing Dr Solomon    She is taking clopidogrel and eliquis    She is writing down all her pills in a book so she can remember them    She has not been having any shortness of breath or chest pain     She is still dealing with dental issues     Review of Systems    Objective   /88   Pulse 78   Ht 1.6 m (5' 3\")   BMI 32.77 kg/m²    Physical Exam  Visit Vitals  /88   Pulse 78   Ht 1.6 m (5' 3\")   BMI 32.77 kg/m²   Smoking Status Never   BSA 1.93 m²      GEN: NAD  HEENT: normal  NECK: no adenopathy, no thyroid enlargment  LUNGS: CTAB  CV: reg S1/S2 no murmurs  EXT: no leg edema   Assessment/Plan   Problem List Items Addressed This Visit       Type 2 diabetes " mellitus with hyperglycemia, with long-term current use of insulin (CMS/[facility]) - Primary I told her I can regulate her insulin.  She will need to see me every 3 months. She does not want to go back to endocrine.     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  No findings confirmed on imaging. I am going to remove this from her problem list as resolved.          Systolic heart failure secondary to coronary artery disease (CMS/[facility]) No active symptoms of CHF.     Relevant Medications    Entresto 24-26 mg tablet    Type 2 diabetes mellitus with microalbuminuria, with long-term current use of insulin (CMS/[facility])     Other Visit Diagnoses       Coronary artery disease involving native coronary artery of native heart with angina pectoris (CMS/[facility])        Relevant Medications    Entresto 24-26 mg tablet. Will monitor as is also on losartan.     Seee me in 3 mo.

## 2024-04-01 NOTE — PATIENT INSTRUCTIONS
Bring your book with your blood sugars to you next visit.    See me again in 3 months.    Close to next visit, get blood test after FASTING overnight.

## 2024-04-02 ENCOUNTER — TELEPHONE (OUTPATIENT)
Dept: PRIMARY CARE | Facility: CLINIC | Age: 79
End: 2024-04-02
Payer: MEDICARE

## 2024-04-02 NOTE — TELEPHONE ENCOUNTER
Patient called to tell you that she has     1 touch insulin pen    She said she was unsure of the brand at yesterday's appointment.       Lisa [phone]

## 2024-04-08 ENCOUNTER — TELEPHONE (OUTPATIENT)
Dept: PRIMARY CARE | Facility: CLINIC | Age: 79
End: 2024-04-08
Payer: MEDICARE

## 2024-04-08 PROBLEM — Z79.4 TYPE 2 DIABETES MELLITUS WITH MICROALBUMINURIA, WITH LONG-TERM CURRENT USE OF INSULIN (MULTI): Status: ACTIVE | Noted: 2024-04-08

## 2024-04-08 PROBLEM — E11.29 TYPE 2 DIABETES MELLITUS WITH MICROALBUMINURIA, WITH LONG-TERM CURRENT USE OF INSULIN (MULTI): Status: ACTIVE | Noted: 2024-04-08

## 2024-04-08 PROBLEM — R80.9 TYPE 2 DIABETES MELLITUS WITH MICROALBUMINURIA, WITH LONG-TERM CURRENT USE OF INSULIN (MULTI): Status: ACTIVE | Noted: 2024-04-08

## 2024-04-08 NOTE — ASSESSMENT & PLAN NOTE
Was a presumptive diagnosis due to unequal blood pressures in her arms.  No findings confirmed on imaging.

## 2024-04-09 NOTE — TELEPHONE ENCOUNTER
Patient calling- is in hospital with a broken leg. Info. Is in patient chart. Wanted to be sure doctor was aware. She may be going to a rehab after hospital. Again wanted doctor to be aware.

## 2024-04-11 ENCOUNTER — TELEPHONE (OUTPATIENT)
Dept: PRIMARY CARE | Facility: CLINIC | Age: 79
End: 2024-04-11

## 2024-04-11 NOTE — TELEPHONE ENCOUNTER
Patient is in [facility] with a broken leg. She left message on phone that they are messing her meds up and her food is all wrong. She seems to be almost frantic about it and frustrated. Not sure if we can do any thing  but maybe talk with her to calm her some. Can doctor call her. She did not leave a number,just hung up.

## 2024-04-15 ENCOUNTER — TELEPHONE (OUTPATIENT)
Dept: PRIMARY CARE | Facility: CLINIC | Age: 79
End: 2024-04-15
Payer: MEDICARE

## 2024-04-15 NOTE — TELEPHONE ENCOUNTER
Admissions Coordinator from [facility]-Becky is inquiring if patient has had recent flu shot or 2 of the pneumonia vaccines?   Patient is admitted to [facility].  Becky was updated that there isn't a record of the flu shot in her file and she's only received the [id number].

## 2024-05-20 ENCOUNTER — TELEPHONE (OUTPATIENT)
Dept: PRIMARY CARE | Facility: CLINIC | Age: 79
End: 2024-05-20
Payer: MEDICARE

## 2024-05-20 NOTE — TELEPHONE ENCOUNTER
Patient called to say she is still in rehab at [facility]. She is trying to get moved somewhere else because she feels they aren't listening to her there and doing the proper P/T.

## 2024-07-01 ENCOUNTER — APPOINTMENT (OUTPATIENT)
Dept: PRIMARY CARE | Facility: CLINIC | Age: 79
End: 2024-07-01
Payer: MEDICARE

## 2024-07-11 ENCOUNTER — DOCUMENTATION (OUTPATIENT)
Dept: PRIMARY CARE | Facility: CLINIC | Age: 79
End: 2024-07-11
Payer: MEDICARE

## 2024-07-15 ENCOUNTER — TELEPHONE (OUTPATIENT)
Dept: PRIMARY CARE | Facility: CLINIC | Age: 79
End: 2024-07-15
Payer: MEDICARE

## 2024-07-15 NOTE — TELEPHONE ENCOUNTER
Patient calling to let you know that she is still in rehab from her fall. She is not at [facility] in [place].   She just wanted to let you know that she is thinking of you and she hopes she can come in soon, but she doesn't know when she is getting out.

## 2024-08-12 ENCOUNTER — TELEPHONE (OUTPATIENT)
Dept: PRIMARY CARE | Facility: CLINIC | Age: 79
End: 2024-08-12
Payer: MEDICARE
